# Patient Record
Sex: MALE | Race: WHITE | NOT HISPANIC OR LATINO
[De-identification: names, ages, dates, MRNs, and addresses within clinical notes are randomized per-mention and may not be internally consistent; named-entity substitution may affect disease eponyms.]

---

## 2017-11-01 ENCOUNTER — APPOINTMENT (OUTPATIENT)
Dept: SURGERY | Facility: CLINIC | Age: 52
End: 2017-11-01

## 2018-02-27 VITALS
HEIGHT: 69 IN | DIASTOLIC BLOOD PRESSURE: 64 MMHG | RESPIRATION RATE: 16 BRPM | HEART RATE: 70 BPM | WEIGHT: 184.97 LBS | SYSTOLIC BLOOD PRESSURE: 145 MMHG | TEMPERATURE: 97 F | OXYGEN SATURATION: 98 %

## 2018-02-27 NOTE — PATIENT PROFILE ADULT. - PMH
Barretts esophagus    BPH (benign prostatic hyperplasia)    GERD (gastroesophageal reflux disease)    HLD (hyperlipidemia)    DEEJAY (obstructive sleep apnea)

## 2018-02-28 ENCOUNTER — INPATIENT (INPATIENT)
Facility: HOSPITAL | Age: 53
LOS: 0 days | Discharge: HOME | DRG: 473 | End: 2018-03-01
Payer: MEDICARE

## 2018-02-28 DIAGNOSIS — M50.20 OTHER CERVICAL DISC DISPLACEMENT, UNSPECIFIED CERVICAL REGION: ICD-10-CM

## 2018-02-28 DIAGNOSIS — Z98.1 ARTHRODESIS STATUS: Chronic | ICD-10-CM

## 2018-02-28 DIAGNOSIS — Z98.890 OTHER SPECIFIED POSTPROCEDURAL STATES: Chronic | ICD-10-CM

## 2018-02-28 LAB
ANION GAP SERPL CALC-SCNC: 14 MMOL/L — SIGNIFICANT CHANGE UP (ref 5–17)
BASOPHILS NFR BLD AUTO: 0.1 % — SIGNIFICANT CHANGE UP (ref 0–2)
BUN SERPL-MCNC: 12 MG/DL — SIGNIFICANT CHANGE UP (ref 7–23)
CALCIUM SERPL-MCNC: 9.4 MG/DL — SIGNIFICANT CHANGE UP (ref 8.4–10.5)
CHLORIDE SERPL-SCNC: 100 MMOL/L — SIGNIFICANT CHANGE UP (ref 96–108)
CO2 SERPL-SCNC: 26 MMOL/L — SIGNIFICANT CHANGE UP (ref 22–31)
CREAT SERPL-MCNC: 1 MG/DL — SIGNIFICANT CHANGE UP (ref 0.5–1.3)
EOSINOPHIL NFR BLD AUTO: 0.2 % — SIGNIFICANT CHANGE UP (ref 0–6)
GLUCOSE SERPL-MCNC: 142 MG/DL — HIGH (ref 70–99)
HCT VFR BLD CALC: 43.9 % — SIGNIFICANT CHANGE UP (ref 39–50)
HGB BLD-MCNC: 15 G/DL — SIGNIFICANT CHANGE UP (ref 13–17)
LYMPHOCYTES # BLD AUTO: 4.7 % — LOW (ref 13–44)
MCHC RBC-ENTMCNC: 29.3 PG — SIGNIFICANT CHANGE UP (ref 27–34)
MCHC RBC-ENTMCNC: 34.2 G/DL — SIGNIFICANT CHANGE UP (ref 32–36)
MCV RBC AUTO: 85.7 FL — SIGNIFICANT CHANGE UP (ref 80–100)
MONOCYTES NFR BLD AUTO: 1.3 % — LOW (ref 2–14)
NEUTROPHILS NFR BLD AUTO: 93.7 % — HIGH (ref 43–77)
PLATELET # BLD AUTO: 182 K/UL — SIGNIFICANT CHANGE UP (ref 150–400)
POTASSIUM SERPL-MCNC: 4.1 MMOL/L — SIGNIFICANT CHANGE UP (ref 3.5–5.3)
POTASSIUM SERPL-SCNC: 4.1 MMOL/L — SIGNIFICANT CHANGE UP (ref 3.5–5.3)
RBC # BLD: 5.12 M/UL — SIGNIFICANT CHANGE UP (ref 4.2–5.8)
RBC # FLD: 12.1 % — SIGNIFICANT CHANGE UP (ref 10.3–16.9)
SODIUM SERPL-SCNC: 140 MMOL/L — SIGNIFICANT CHANGE UP (ref 135–145)
WBC # BLD: 11.8 K/UL — HIGH (ref 3.8–10.5)
WBC # FLD AUTO: 11.8 K/UL — HIGH (ref 3.8–10.5)

## 2018-02-28 RX ORDER — SIMVASTATIN 20 MG/1
20 TABLET, FILM COATED ORAL AT BEDTIME
Qty: 0 | Refills: 0 | Status: DISCONTINUED | OUTPATIENT
Start: 2018-02-28 | End: 2018-03-01

## 2018-02-28 RX ORDER — ACETAMINOPHEN 500 MG
650 TABLET ORAL EVERY 6 HOURS
Qty: 0 | Refills: 0 | Status: DISCONTINUED | OUTPATIENT
Start: 2018-02-28 | End: 2018-03-01

## 2018-02-28 RX ORDER — ESOMEPRAZOLE MAGNESIUM 40 MG/1
1 CAPSULE, DELAYED RELEASE ORAL
Qty: 0 | Refills: 0 | COMMUNITY

## 2018-02-28 RX ORDER — MAGNESIUM HYDROXIDE 400 MG/1
30 TABLET, CHEWABLE ORAL EVERY 12 HOURS
Qty: 0 | Refills: 0 | Status: DISCONTINUED | OUTPATIENT
Start: 2018-02-28 | End: 2018-03-01

## 2018-02-28 RX ORDER — CYCLOBENZAPRINE HYDROCHLORIDE 10 MG/1
10 TABLET, FILM COATED ORAL EVERY 8 HOURS
Qty: 0 | Refills: 0 | Status: DISCONTINUED | OUTPATIENT
Start: 2018-02-28 | End: 2018-03-01

## 2018-02-28 RX ORDER — OMEGA-3 ACID ETHYL ESTERS 1 G
2 CAPSULE ORAL
Qty: 0 | Refills: 0 | Status: DISCONTINUED | OUTPATIENT
Start: 2018-02-28 | End: 2018-03-01

## 2018-02-28 RX ORDER — CEFAZOLIN SODIUM 1 G
2000 VIAL (EA) INJECTION EVERY 8 HOURS
Qty: 0 | Refills: 0 | Status: DISCONTINUED | OUTPATIENT
Start: 2018-02-28 | End: 2018-03-01

## 2018-02-28 RX ORDER — SODIUM CHLORIDE 9 MG/ML
1000 INJECTION, SOLUTION INTRAVENOUS
Qty: 0 | Refills: 0 | Status: DISCONTINUED | OUTPATIENT
Start: 2018-02-28 | End: 2018-03-01

## 2018-02-28 RX ORDER — TADALAFIL 10 MG/1
1 TABLET, FILM COATED ORAL
Qty: 0 | Refills: 0 | COMMUNITY

## 2018-02-28 RX ORDER — OXYCODONE AND ACETAMINOPHEN 5; 325 MG/1; MG/1
1 TABLET ORAL EVERY 4 HOURS
Qty: 0 | Refills: 0 | Status: DISCONTINUED | OUTPATIENT
Start: 2018-02-28 | End: 2018-03-01

## 2018-02-28 RX ORDER — SENNA PLUS 8.6 MG/1
2 TABLET ORAL AT BEDTIME
Qty: 0 | Refills: 0 | Status: DISCONTINUED | OUTPATIENT
Start: 2018-02-28 | End: 2018-03-01

## 2018-02-28 RX ORDER — DOXAZOSIN MESYLATE 4 MG
4 TABLET ORAL AT BEDTIME
Qty: 0 | Refills: 0 | Status: DISCONTINUED | OUTPATIENT
Start: 2018-02-28 | End: 2018-03-01

## 2018-02-28 RX ORDER — DOCUSATE SODIUM 100 MG
100 CAPSULE ORAL THREE TIMES A DAY
Qty: 0 | Refills: 0 | Status: DISCONTINUED | OUTPATIENT
Start: 2018-02-28 | End: 2018-03-01

## 2018-02-28 RX ORDER — ALFUZOSIN HYDROCHLORIDE 10 MG/1
1 TABLET, EXTENDED RELEASE ORAL
Qty: 0 | Refills: 0 | COMMUNITY

## 2018-02-28 RX ORDER — SIMVASTATIN 20 MG/1
1 TABLET, FILM COATED ORAL
Qty: 0 | Refills: 0 | COMMUNITY

## 2018-02-28 RX ORDER — ONDANSETRON 8 MG/1
4 TABLET, FILM COATED ORAL EVERY 6 HOURS
Qty: 0 | Refills: 0 | Status: DISCONTINUED | OUTPATIENT
Start: 2018-02-28 | End: 2018-03-01

## 2018-02-28 RX ORDER — OXYCODONE AND ACETAMINOPHEN 5; 325 MG/1; MG/1
2 TABLET ORAL EVERY 6 HOURS
Qty: 0 | Refills: 0 | Status: DISCONTINUED | OUTPATIENT
Start: 2018-02-28 | End: 2018-03-01

## 2018-02-28 RX ORDER — FAMOTIDINE 10 MG/ML
20 INJECTION INTRAVENOUS EVERY 12 HOURS
Qty: 0 | Refills: 0 | Status: DISCONTINUED | OUTPATIENT
Start: 2018-02-28 | End: 2018-03-01

## 2018-02-28 RX ADMIN — Medication 100 MILLIGRAM(S): at 23:19

## 2018-02-28 RX ADMIN — SIMVASTATIN 20 MILLIGRAM(S): 20 TABLET, FILM COATED ORAL at 23:13

## 2018-02-28 RX ADMIN — CYCLOBENZAPRINE HYDROCHLORIDE 10 MILLIGRAM(S): 10 TABLET, FILM COATED ORAL at 23:14

## 2018-02-28 RX ADMIN — SENNA PLUS 2 TABLET(S): 8.6 TABLET ORAL at 23:13

## 2018-02-28 RX ADMIN — OXYCODONE AND ACETAMINOPHEN 2 TABLET(S): 5; 325 TABLET ORAL at 23:14

## 2018-02-28 RX ADMIN — Medication 100 MILLIGRAM(S): at 23:13

## 2018-02-28 NOTE — PACU DISCHARGE NOTE - COMMENTS
Report given to Lily HERNANDEZ. VSS. Pt. in NAD. rerfusing pain medication. IV site intact. transported to room on monitor and oxygen

## 2018-02-28 NOTE — H&P ADULT - HISTORY OF PRESENT ILLNESS
52M retired [employer] c/o neck pain worsened over time without improvement radiating down Left arm. Also c/o b/l hand numbness. Ambulates without assist. Presents today for elective ACDF C5-6.

## 2018-03-01 ENCOUNTER — TRANSCRIPTION ENCOUNTER (OUTPATIENT)
Age: 53
End: 2018-03-01

## 2018-03-01 VITALS
TEMPERATURE: 98 F | DIASTOLIC BLOOD PRESSURE: 65 MMHG | SYSTOLIC BLOOD PRESSURE: 143 MMHG | HEART RATE: 82 BPM | OXYGEN SATURATION: 95 % | RESPIRATION RATE: 16 BRPM

## 2018-03-01 LAB
ANION GAP SERPL CALC-SCNC: 14 MMOL/L — SIGNIFICANT CHANGE UP (ref 5–17)
BASOPHILS NFR BLD AUTO: 0.1 % — SIGNIFICANT CHANGE UP (ref 0–2)
BUN SERPL-MCNC: 9 MG/DL — SIGNIFICANT CHANGE UP (ref 7–23)
CALCIUM SERPL-MCNC: 9.4 MG/DL — SIGNIFICANT CHANGE UP (ref 8.4–10.5)
CHLORIDE SERPL-SCNC: 101 MMOL/L — SIGNIFICANT CHANGE UP (ref 96–108)
CO2 SERPL-SCNC: 26 MMOL/L — SIGNIFICANT CHANGE UP (ref 22–31)
CREAT SERPL-MCNC: 0.89 MG/DL — SIGNIFICANT CHANGE UP (ref 0.5–1.3)
EOSINOPHIL NFR BLD AUTO: 0.1 % — SIGNIFICANT CHANGE UP (ref 0–6)
GLUCOSE SERPL-MCNC: 137 MG/DL — HIGH (ref 70–99)
HCT VFR BLD CALC: 41.7 % — SIGNIFICANT CHANGE UP (ref 39–50)
HGB BLD-MCNC: 13.9 G/DL — SIGNIFICANT CHANGE UP (ref 13–17)
LYMPHOCYTES # BLD AUTO: 8.1 % — LOW (ref 13–44)
MCHC RBC-ENTMCNC: 28 PG — SIGNIFICANT CHANGE UP (ref 27–34)
MCHC RBC-ENTMCNC: 33.3 G/DL — SIGNIFICANT CHANGE UP (ref 32–36)
MCV RBC AUTO: 84.1 FL — SIGNIFICANT CHANGE UP (ref 80–100)
MONOCYTES NFR BLD AUTO: 9.1 % — SIGNIFICANT CHANGE UP (ref 2–14)
NEUTROPHILS NFR BLD AUTO: 82.6 % — HIGH (ref 43–77)
PLATELET # BLD AUTO: 227 K/UL — SIGNIFICANT CHANGE UP (ref 150–400)
POTASSIUM SERPL-MCNC: 4.3 MMOL/L — SIGNIFICANT CHANGE UP (ref 3.5–5.3)
POTASSIUM SERPL-SCNC: 4.3 MMOL/L — SIGNIFICANT CHANGE UP (ref 3.5–5.3)
RBC # BLD: 4.96 M/UL — SIGNIFICANT CHANGE UP (ref 4.2–5.8)
RBC # FLD: 12.2 % — SIGNIFICANT CHANGE UP (ref 10.3–16.9)
SODIUM SERPL-SCNC: 141 MMOL/L — SIGNIFICANT CHANGE UP (ref 135–145)
WBC # BLD: 15 K/UL — HIGH (ref 3.8–10.5)
WBC # FLD AUTO: 15 K/UL — HIGH (ref 3.8–10.5)

## 2018-03-01 PROCEDURE — C1713: CPT

## 2018-03-01 PROCEDURE — 76000 FLUOROSCOPY <1 HR PHYS/QHP: CPT

## 2018-03-01 PROCEDURE — 97161 PT EVAL LOW COMPLEX 20 MIN: CPT

## 2018-03-01 PROCEDURE — 36415 COLL VENOUS BLD VENIPUNCTURE: CPT

## 2018-03-01 PROCEDURE — C1889: CPT

## 2018-03-01 PROCEDURE — 86850 RBC ANTIBODY SCREEN: CPT

## 2018-03-01 PROCEDURE — 80048 BASIC METABOLIC PNL TOTAL CA: CPT

## 2018-03-01 PROCEDURE — 86901 BLOOD TYPING SEROLOGIC RH(D): CPT

## 2018-03-01 PROCEDURE — 95940 IONM IN OPERATNG ROOM 15 MIN: CPT

## 2018-03-01 PROCEDURE — 86900 BLOOD TYPING SEROLOGIC ABO: CPT

## 2018-03-01 PROCEDURE — 85025 COMPLETE CBC W/AUTO DIFF WBC: CPT

## 2018-03-01 RX ORDER — SENNA PLUS 8.6 MG/1
2 TABLET ORAL
Qty: 0 | Refills: 0 | COMMUNITY
Start: 2018-03-01

## 2018-03-01 RX ORDER — OMEGA-3 ACID ETHYL ESTERS 1 G
2 CAPSULE ORAL
Qty: 0 | Refills: 0 | COMMUNITY

## 2018-03-01 RX ORDER — DOCUSATE SODIUM 100 MG
1 CAPSULE ORAL
Qty: 0 | Refills: 0 | COMMUNITY
Start: 2018-03-01

## 2018-03-01 RX ORDER — BENZOCAINE AND MENTHOL 5; 1 G/100ML; G/100ML
1 LIQUID ORAL DAILY
Qty: 0 | Refills: 0 | Status: DISCONTINUED | OUTPATIENT
Start: 2018-03-01 | End: 2018-03-01

## 2018-03-01 RX ORDER — CEFAZOLIN SODIUM 1 G
2000 VIAL (EA) INJECTION EVERY 8 HOURS
Qty: 0 | Refills: 0 | Status: DISCONTINUED | OUTPATIENT
Start: 2018-03-01 | End: 2018-03-01

## 2018-03-01 RX ORDER — ASPIRIN/CALCIUM CARB/MAGNESIUM 324 MG
1 TABLET ORAL
Qty: 0 | Refills: 0 | COMMUNITY

## 2018-03-01 RX ORDER — TRAMADOL HYDROCHLORIDE 50 MG/1
1 TABLET ORAL
Qty: 28 | Refills: 0 | OUTPATIENT
Start: 2018-03-01 | End: 2018-03-07

## 2018-03-01 RX ORDER — CYCLOBENZAPRINE HYDROCHLORIDE 10 MG/1
1 TABLET, FILM COATED ORAL
Qty: 21 | Refills: 0 | OUTPATIENT
Start: 2018-03-01 | End: 2018-03-07

## 2018-03-01 RX ORDER — BENZOCAINE AND MENTHOL 5; 1 G/100ML; G/100ML
0 LIQUID ORAL
Qty: 0 | Refills: 0 | COMMUNITY
Start: 2018-03-01

## 2018-03-01 RX ADMIN — Medication 650 MILLIGRAM(S): at 06:57

## 2018-03-01 RX ADMIN — Medication 2 GRAM(S): at 06:53

## 2018-03-01 RX ADMIN — OXYCODONE AND ACETAMINOPHEN 2 TABLET(S): 5; 325 TABLET ORAL at 00:09

## 2018-03-01 RX ADMIN — CYCLOBENZAPRINE HYDROCHLORIDE 10 MILLIGRAM(S): 10 TABLET, FILM COATED ORAL at 06:52

## 2018-03-01 RX ADMIN — Medication 100 MILLIGRAM(S): at 06:53

## 2018-03-01 RX ADMIN — BENZOCAINE AND MENTHOL 1 LOZENGE: 5; 1 LIQUID ORAL at 04:14

## 2018-03-01 RX ADMIN — Medication 650 MILLIGRAM(S): at 07:57

## 2018-03-01 RX ADMIN — Medication 100 MILLIGRAM(S): at 06:52

## 2018-03-01 NOTE — DISCHARGE NOTE ADULT - PLAN OF CARE
Decreased pain and improved stability after surgery Wear soft collar in the car and for comfort  No strenuous activity (bending/twisting), heavy lifting, driving or returning to work until cleared by MD.  You may shower. Remove dressing daily before shower, pat the area dry gently then reapply dry gauze dressing x 5 days, then leave incision open to air.   Try to have regular bowel movements, take stool softener or laxative if necessary.  May take pepcid or zantac for upset stomach.  Ice affected areas to decrease swelling.  Call to schedule an appt with Dr. Gonzalez for follow up, if you have staples or sutures they will be removed in office.  Contact your doctor if you experience: fever greater than 101.5, chills, chest pain, difficulty breathing, redness or excessive drainage around the incision, other concerns.

## 2018-03-01 NOTE — PROGRESS NOTE ADULT - SUBJECTIVE AND OBJECTIVE BOX
Patient seen and examined at bedside.     SUBJECTIVE: No acute events overnight.  Pain tolerable.    Denies Chest Pain/SOB.    Denies Headache.    Denies Nausea/vomiting.      OBJECTIVE:   T(C): 36.8 (03-01-18 @ 04:00), Max: 37 (03-01-18 @ 00:10)  T(F): 98.3 (03-01-18 @ 04:00), Max: 98.6 (03-01-18 @ 00:10)  HR: 90 (03-01-18 @ 04:00) (78 - 106)  BP: 134/69 (03-01-18 @ 04:00) (127/69 - 179/86)  RR:  (10 - 27)  SpO2:  (95% - 98%)  Wt(kg): --    NAD, non labored respirations  Affected extremity:          Dressing: clean/dry/intact          Drains: 1         Sensation: [ x ] intact to light touch  [ ] decreased                              Vascular: [ x ] warm well perfused; capillary refill <3 seconds          Motor exam: [x ]         [x ] Upper extremity                   [place] (c5)   Bi(c5/6)   WE(c6)   EE(c7)    (c8)                                                R           5              5            5             5             5                                               L            5              5            5             5            5         [ ] Lower extremity                   IP(L2)     Q(L3)     TA(L4)     EHL(L5)     GS(s1)                                                 R          5           5          5            5              5                                               L           5           5         5             5              5                                     15.0   11.8<H> )-------------------( 182      ( 02-28 @ 19:35 )                 43.9     I&O's Detail    28 Feb 2018 07:01  -  01 Mar 2018 06:46  --------------------------------------------------------  IN:    lactated ringers.: 900 mL    Oral Fluid: 360 mL  Total IN: 1260 mL    OUT:    Accordian: 5 mL    Voided: 1450 mL  Total OUT: 1455 mL    Total NET: -195 mL          A/P :  52y Male s/p C5-6 ACDF       -    Pain control + bowel regimen  -    DVT ppx: SCDs    -    Periop abx    -    ADAT  -    Check AM labs  -    Weight bearing status:   WBAT        -    Physical Therapy  -    Resume home meds  -    Dispo planning

## 2018-03-01 NOTE — DISCHARGE NOTE ADULT - MEDICATION SUMMARY - MEDICATIONS TO TAKE
I will START or STAY ON the medications listed below when I get home from the hospital:    Cialis 5 mg oral tablet  -- 1 tab(s) by mouth once a day  -- Indication: For Home medicine    traMADol 50 mg oral tablet  -- 1/2 to 1 tab(s) by mouth every 6 hours, as needed, pain MDD:4    -- Caution federal law prohibits the transfer of this drug to any person other  than the person for whom it was prescribed.  May cause drowsiness.  Alcohol may intensify this effect.  Use care when operating dangerous machinery.  Obtain medical advice before taking any non-prescription drugs as some may affect the action of this medication.    -- Indication: For Moderate to severe incisional pain    Uroxatral 10 mg oral tablet, extended release  -- 1 tab(s) by mouth once a day  -- Indication: For Home cardiovascular agent    simvastatin 20 mg oral tablet  -- 1 tab(s) by mouth once a day (at bedtime)  -- Indication: For cholesterol lowering medicine    docusate sodium 100 mg oral capsule  -- 1 cap(s) by mouth 3 times a day  -- Indication: For constipation    senna oral tablet  -- 2 tab(s) by mouth once a day (at bedtime)  -- Indication: For constipation    benzocaine-menthol 15 mg-3.6 mg mucous membrane lozenge  --  mucous membrane lozenge every 2 hours, as needed, sore throat  -- Indication: For sore throat    cyclobenzaprine 10 mg oral tablet  -- 1 tab(s) by mouth every 8 hours, as needed, muscle spasms MDD:3    -- May cause drowsiness.  Alcohol may intensify this effect.  Use care when operating dangerous machinery.  Obtain medical advice before taking any non-prescription drugs as some may affect the action of this medication.    -- Indication: For Muscle spasm    NexIUM 20 mg oral delayed release capsule  -- 1 cap(s) by mouth once a day  -- Indication: For GERD (gastroesophageal reflux disease)

## 2018-03-01 NOTE — PROGRESS NOTE ADULT - SUBJECTIVE AND OBJECTIVE BOX
Ortho Post Op Check    Procedure: ACDF C5-C6  Surgeon: Catina    Pt comfortable without complaints, pain controlled  Denies CP, SOB, N/V, numbness/tingling     Vital Signs Last 24 Hrs  T(C): 37 (03-01-18 @ 00:10), Max: 37 (03-01-18 @ 00:10)  T(F): 98.6 (03-01-18 @ 00:10), Max: 98.6 (03-01-18 @ 00:10)  HR: 82 (03-01-18 @ 00:10) (82 - 97)  BP: 127/69 (03-01-18 @ 00:10) (127/69 - 135/78)  BP(mean): --  RR: 16 (03-01-18 @ 00:10) (16 - 16)  SpO2: 98% (03-01-18 @ 00:10) (96% - 98%)  AVSS    General: Pt Alert and oriented, NAD  DSG C/D/I  Pulses: 2+ rad bialt   Sensation: SILT C5-T1 bilat  Motor: +D/B/T/WE/WF/IO bilat                          15.0   11.8  )-----------( 182      ( 28 Feb 2018 19:35 )             43.9   28 Feb 2018 19:35    140    |  100    |  12     ----------------------------<  142    4.1     |  26     |  1.00       A/P: 52yMale POD#0 s/p ACDF C5-C6  - Stable  - Pain Control  - DVT ppx: scd  - Post op abx: ancef  - PT, WBS: wbat    Ortho Pager [phone]

## 2018-03-01 NOTE — PROVIDER CONTACT NOTE (OTHER) - ASSESSMENT
Pt stated he has a "tickle in my throat" and is coughing due to the tickle. Pt asked if he can have a lozenge.

## 2018-03-01 NOTE — DISCHARGE NOTE ADULT - CARE PLAN
Principal Discharge DX:	HNP (herniated nucleus pulposus), cervical  Goal:	Decreased pain and improved stability after surgery  Assessment and plan of treatment:	Wear soft collar in the car and for comfort  No strenuous activity (bending/twisting), heavy lifting, driving or returning to work until cleared by MD.  You may shower. Remove dressing daily before shower, pat the area dry gently then reapply dry gauze dressing x 5 days, then leave incision open to air.   Try to have regular bowel movements, take stool softener or laxative if necessary.  May take pepcid or zantac for upset stomach.  Ice affected areas to decrease swelling.  Call to schedule an appt with Dr. Gonzalez for follow up, if you have staples or sutures they will be removed in office.  Contact your doctor if you experience: fever greater than 101.5, chills, chest pain, difficulty breathing, redness or excessive drainage around the incision, other concerns.

## 2018-03-01 NOTE — DISCHARGE NOTE ADULT - PATIENT PORTAL LINK FT
You can access the [facility] Patient Portal, offered by [facility], by registering with the following website: [email or web address]

## 2018-03-11 DIAGNOSIS — M48.02 SPINAL STENOSIS, CERVICAL REGION: ICD-10-CM

## 2018-03-11 DIAGNOSIS — E78.5 HYPERLIPIDEMIA, UNSPECIFIED: ICD-10-CM

## 2018-03-11 DIAGNOSIS — Z79.82 LONG TERM (CURRENT) USE OF ASPIRIN: ICD-10-CM

## 2018-03-11 DIAGNOSIS — K22.70 BARRETT'S ESOPHAGUS WITHOUT DYSPLASIA: ICD-10-CM

## 2018-03-11 DIAGNOSIS — M54.2 CERVICALGIA: ICD-10-CM

## 2018-03-11 DIAGNOSIS — K21.9 GASTRO-ESOPHAGEAL REFLUX DISEASE WITHOUT ESOPHAGITIS: ICD-10-CM

## 2018-03-11 DIAGNOSIS — M50.222 OTHER CERVICAL DISC DISPLACEMENT AT C5-C6 LEVEL: ICD-10-CM

## 2018-03-11 DIAGNOSIS — G47.33 OBSTRUCTIVE SLEEP APNEA (ADULT) (PEDIATRIC): ICD-10-CM

## 2019-01-14 ENCOUNTER — RESULT REVIEW (OUTPATIENT)
Age: 54
End: 2019-01-14

## 2019-10-29 NOTE — DISCHARGE NOTE ADULT - VISION (WITH CORRECTIVE LENSES IF THE PATIENT USUALLY WEARS THEM):
Normal vision: sees adequately in most situations; can see medication labels, newsprint A-T Advancement Flap Text: The defect edges were debeveled with a #15 scalpel blade.  Given the location of the defect, shape of the defect and the proximity to free margins an A-T advancement flap was deemed most appropriate.  Using a sterile surgical marker, an appropriate advancement flap was drawn incorporating the defect and placing the expected incisions within the relaxed skin tension lines where possible.    The area thus outlined was incised deep to adipose tissue with a #15 scalpel blade.  The skin margins were undermined to an appropriate distance in all directions utilizing iris scissors.

## 2022-04-15 NOTE — H&P ADULT - ASSESSMENT
52M with neck pain Rhomboid Transposition Flap Text: The defect edges were debeveled with a #15 scalpel blade.  Given the location of the defect and the proximity to free margins a rhomboid transposition flap was deemed most appropriate.  Using a sterile surgical marker, an appropriate rhomboid flap was drawn incorporating the defect.    The area thus outlined was incised deep to adipose tissue with a #15 scalpel blade.  The skin margins were undermined to an appropriate distance in all directions utilizing iris scissors.

## 2022-11-01 NOTE — PHYSICAL THERAPY INITIAL EVALUATION ADULT - ASSISTIVE DEVICE FOR STAIR TRANSFER, REHAB EVAL
bilateral rails Tumor Depth: Less than 6mm from granular layer and no invasion beyond the subcutaneous fat

## 2023-02-10 NOTE — PACU DISCHARGE NOTE - NAUSEA/VOMITING:
PRINCIPAL DISCHARGE DIAGNOSIS  Diagnosis: Malignant neoplasm of colon  Assessment and Plan of Treatment:       
None

## 2023-12-27 NOTE — PATIENT PROFILE ADULT. - FUNCTIONAL SCREEN CURRENT LEVEL: SWALLOWING (IF SCORE 2 OR MORE FOR ANY ITEM, CONSULT REHAB SERVICES), MLM)
Please follow up with your primary care physician for elevated blood pressure reading.    Take:   ALBUTEROL 108 (90 BASE) MCG/ACT INHALER    Inhale 2 puffs into the lungs every 4 hours as needed for Shortness of Breath or Wheezing.   PREDNISONE (DELTASONE) 20 MG TABLET    Take 2 tablets by mouth daily for 4 days. Do not start before December 28, 2023.       Self-isolate until your COVID-19 PCR results in 24-48 hours.  If your COVID-19 PCR is positive, please self-isolate & stay home for 5 days.  If you have NO symptoms or your symptoms are resolving after 5 days, you can leave your house.  Continue to wear a mask around others for 5 additional days.  If you have a FEVER, continue to STAY home until your fever RESOLVES.    Wash hands frequently.  Increase oral intake of fluids to 8 glasses a day (50 oz) and rest when feeling tired.  Do warm salt water gargles four times a day for sore throat.  Avoid exposure to cigarettes and alcohol.    Take:  OTC Tylenol or Ibuprofen as needed for fever, chills, or bodyaches.   OTC Benadryl as needed for pruritus or nasal congestion.   OTC Vitamin C 1000 mg orally daily, Vitamin D 2000 units orally daily and Zinc Sulfate 1 tab orally daily.    If you have any of these symptoms, call 911:  Trouble breathing that gets worse  Pain or pressure in chest that gets worse  Blue tint to lips or face  Fast or irregular heartbeat  Confusion or trouble waking  Fainting or loss of consciousness  Coughing up blood    Due to the recent Delta variant and it's highly contagiousness and serious illness potential, anyone 12 years-old or older in the household are strongly encouraged to get COVID vaccinations as soon as possible.     Please consider your local pharmacy.    
(0) swallows foods/liquids without difficulty

## 2024-01-30 PROBLEM — K21.9 GASTRO-ESOPHAGEAL REFLUX DISEASE WITHOUT ESOPHAGITIS: Chronic | Status: ACTIVE | Noted: 2018-02-27

## 2024-01-30 PROBLEM — K22.70 BARRETT'S ESOPHAGUS WITHOUT DYSPLASIA: Chronic | Status: ACTIVE | Noted: 2018-02-27

## 2024-01-30 PROBLEM — N40.0 BENIGN PROSTATIC HYPERPLASIA WITHOUT LOWER URINARY TRACT SYMPTOMS: Chronic | Status: ACTIVE | Noted: 2018-02-27

## 2024-01-30 PROBLEM — G47.33 OBSTRUCTIVE SLEEP APNEA (ADULT) (PEDIATRIC): Chronic | Status: ACTIVE | Noted: 2018-02-27

## 2024-01-30 PROBLEM — E78.5 HYPERLIPIDEMIA, UNSPECIFIED: Chronic | Status: ACTIVE | Noted: 2018-02-27

## 2024-03-14 ENCOUNTER — APPOINTMENT (OUTPATIENT)
Dept: ORTHOPEDIC SURGERY | Facility: CLINIC | Age: 59
End: 2024-03-14
Payer: OTHER MISCELLANEOUS

## 2024-03-14 VITALS
HEART RATE: 89 BPM | BODY MASS INDEX: 25.62 KG/M2 | WEIGHT: 173 LBS | SYSTOLIC BLOOD PRESSURE: 104 MMHG | HEIGHT: 69 IN | OXYGEN SATURATION: 96 % | TEMPERATURE: 97.9 F | DIASTOLIC BLOOD PRESSURE: 67 MMHG

## 2024-03-14 DIAGNOSIS — Z86.39 PERSONAL HISTORY OF OTHER ENDOCRINE, NUTRITIONAL AND METABOLIC DISEASE: ICD-10-CM

## 2024-03-14 DIAGNOSIS — Z78.9 OTHER SPECIFIED HEALTH STATUS: ICD-10-CM

## 2024-03-14 DIAGNOSIS — Z85.9 PERSONAL HISTORY OF MALIGNANT NEOPLASM, UNSPECIFIED: ICD-10-CM

## 2024-03-14 DIAGNOSIS — Z87.39 PERSONAL HISTORY OF OTHER DISEASES OF THE MUSCULOSKELETAL SYSTEM AND CONNECTIVE TISSUE: ICD-10-CM

## 2024-03-14 DIAGNOSIS — M48.061 SPINAL STENOSIS, LUMBAR REGION WITHOUT NEUROGENIC CLAUDICATION: ICD-10-CM

## 2024-03-14 PROCEDURE — 99204 OFFICE O/P NEW MOD 45 MIN: CPT

## 2024-03-14 RX ORDER — DICLOFENAC SODIUM 1% 10 MG/G
1 GEL TOPICAL DAILY
Qty: 1 | Refills: 0 | Status: ACTIVE | COMMUNITY
Start: 2024-03-14 | End: 1900-01-01

## 2024-03-14 RX ORDER — PANTOPRAZOLE SODIUM 40 MG/1
40 TABLET, DELAYED RELEASE ORAL
Refills: 0 | Status: ACTIVE | COMMUNITY

## 2024-03-14 RX ORDER — ACALABRUTINIB 100 MG/1
100 TABLET, FILM COATED ORAL
Refills: 0 | Status: ACTIVE | COMMUNITY

## 2024-03-14 RX ORDER — METHYLPREDNISOLONE 4 MG/1
4 TABLET ORAL
Qty: 1 | Refills: 0 | Status: ACTIVE | COMMUNITY
Start: 2024-03-14 | End: 1900-01-01

## 2024-03-14 RX ORDER — ALFUZOSIN HYDROCHLORIDE 10 MG/1
TABLET, EXTENDED RELEASE ORAL
Refills: 0 | Status: ACTIVE | COMMUNITY

## 2024-03-14 RX ORDER — VENETOCLAX 100 MG/1
100 TABLET, FILM COATED ORAL
Refills: 0 | Status: ACTIVE | COMMUNITY

## 2024-03-14 RX ORDER — SIMVASTATIN 80 MG/1
TABLET, FILM COATED ORAL
Refills: 0 | Status: ACTIVE | COMMUNITY

## 2024-03-14 RX ORDER — TRAMADOL HYDROCHLORIDE 50 MG/1
50 TABLET, COATED ORAL
Qty: 60 | Refills: 0 | Status: ACTIVE | COMMUNITY
Start: 2024-03-14 | End: 1900-01-01

## 2024-03-14 RX ORDER — TADALAFIL 5 MG/1
5 TABLET, FILM COATED ORAL
Refills: 0 | Status: ACTIVE | COMMUNITY

## 2024-03-15 NOTE — PLAN
[FreeTextEntry1] : - Prescribe medications: diclofenac, Tramadol, Voltaren gel, and a Medrol dose pack.   - Patient to follow up with his pain management specialist.   - Avoid aggressive treatments, including surgical intervention, due to ongoing lymphoma treatment.    - Consider future extension of fusion distally to L5 if required and when patient's overall health allows.

## 2024-03-15 NOTE — ASSESSMENT
[FreeTextEntry1] : Thuan is experiencing severe hamstring pain, likely due to progressive stenosis as revealed by his MRI. His condition is complicated by ongoing lymphoma treatment.

## 2024-03-15 NOTE — PHYSICAL EXAM
[de-identified] : A new MRI reveals progressive lateral recess and foraminal stenosis at L3-L4 and L4-L5, consistent with his symptoms.

## 2024-03-15 NOTE — END OF VISIT
[FreeTextEntry3] : I Haley Amaya, acting as scribe, attest that this documentation has been prepared under the direction and in the presence of Provider Alex Gonzalez MD.   I was physically present during the service to the patient and/or personally examined the patient and I was directly involved in the management plan and recommendations of the care provided to the patient.   I Dr. Gonzalez, reviewed the history, the physical exam, and plan as documented by the PA. The documentation recorded by the PA, in my presence, accurately reflects the service I personally performed, and the decisions made by me with my edits as appropriate.  Alex Gonzalez MD

## 2024-03-15 NOTE — HISTORY OF PRESENT ILLNESS
[de-identified] : Thuan presents with hamstring pain, which recently escalated to a severity of nine out of 10 while he was driving. His last epidural steroid injection provided only short-lived relief. He is currently being treated for lymphoma and is not eager to pursue aggressive treatments such as surgical intervention.

## 2024-04-08 ENCOUNTER — RX RENEWAL (OUTPATIENT)
Age: 59
End: 2024-04-08

## 2024-04-08 RX ORDER — DICLOFENAC SODIUM 75 MG/1
75 TABLET, DELAYED RELEASE ORAL
Qty: 60 | Refills: 11 | Status: ACTIVE | COMMUNITY
Start: 2024-03-14 | End: 1900-01-01

## 2024-08-22 ENCOUNTER — TRANSCRIPTION ENCOUNTER (OUTPATIENT)
Age: 59
End: 2024-08-22

## 2024-08-23 ENCOUNTER — TRANSCRIPTION ENCOUNTER (OUTPATIENT)
Age: 59
End: 2024-08-23

## 2025-01-21 NOTE — DISCHARGE NOTE ADULT - ABILITY TO HEAR (WITH HEARING AID OR HEARING APPLIANCE IF NORMALLY USED):
Normal volume, rate, productivity, spontaneity and articulation Adequate: hears normal conversation without difficulty Normal volume, rate, productivity, spontaneity and articulation Normal volume, rate, productivity, spontaneity and articulation

## 2025-02-20 ENCOUNTER — OFFICE (OUTPATIENT)
Dept: URBAN - METROPOLITAN AREA CLINIC 12 | Facility: CLINIC | Age: 60
Setting detail: OPHTHALMOLOGY
End: 2025-02-20
Payer: MEDICARE

## 2025-02-20 DIAGNOSIS — H43.392: ICD-10-CM

## 2025-02-20 DIAGNOSIS — H43.391: ICD-10-CM

## 2025-02-20 DIAGNOSIS — H25.13: ICD-10-CM

## 2025-02-20 DIAGNOSIS — H18.413: ICD-10-CM

## 2025-02-20 PROCEDURE — 92014 COMPRE OPH EXAM EST PT 1/>: CPT | Performed by: OPHTHALMOLOGY

## 2025-02-20 ASSESSMENT — REFRACTION_AUTOREFRACTION
OS_SPHERE: +2.50
OD_AXIS: 105
OD_SPHERE: +2.25
OS_CYLINDER: -1.25
OS_AXIS: 081
OD_CYLINDER: -1.00

## 2025-02-20 ASSESSMENT — REFRACTION_MANIFEST
OD_AXIS: 100
OS_AXIS: 082
OD_ADD: +2.25
OS_VA1: 20/20-
OD_CYLINDER: -0.75
OS_VA1: 20/20
OD_ADD: +2.00
OD_SPHERE: +1.00
OS_SPHERE: +1.25
OS_AXIS: 083
OS_SPHERE: +1.00
OS_ADD: +2.25
OS_CYLINDER: -0.75
OD_VA1: 20/20
OS_ADD: +2.00
OS_CYLINDER: -0.75
OD_SPHERE: +1.25
OD_VA1: 20/25-
OD_CYLINDER: -1.00
OD_AXIS: 102

## 2025-02-20 ASSESSMENT — REFRACTION_CURRENTRX
OD_ADD: +2.00
OS_OVR_VA: 20/
OD_OVR_VA: 20/
OS_ADD: +2.00
OS_VPRISM_DIRECTION: SV
OD_VPRISM_DIRECTION: SV

## 2025-02-20 ASSESSMENT — KERATOMETRY
OD_AXISANGLE_DEGREES: 023
OS_AXISANGLE_DEGREES: 145
OS_K1POWER_DIOPTERS: 42.00
OS_K2POWER_DIOPTERS: 42.25
METHOD_AUTO_MANUAL: AUTO
OD_K1POWER_DIOPTERS: 41.25
OD_K2POWER_DIOPTERS: 41.75

## 2025-02-20 ASSESSMENT — VISUAL ACUITY
OS_BCVA: 20/50+2
OD_BCVA: 20/30-1

## 2025-02-20 ASSESSMENT — TONOMETRY
OD_IOP_MMHG: 16
OS_IOP_MMHG: 16

## 2025-02-20 ASSESSMENT — CONFRONTATIONAL VISUAL FIELD TEST (CVF)
OS_FINDINGS: FULL
OD_FINDINGS: FULL